# Patient Record
Sex: FEMALE | Race: OTHER | Employment: UNEMPLOYED | URBAN - METROPOLITAN AREA
[De-identification: names, ages, dates, MRNs, and addresses within clinical notes are randomized per-mention and may not be internally consistent; named-entity substitution may affect disease eponyms.]

---

## 2018-11-25 ENCOUNTER — APPOINTMENT (OUTPATIENT)
Dept: GENERAL RADIOLOGY | Age: 58
End: 2018-11-25
Attending: PHYSICIAN ASSISTANT

## 2018-11-25 ENCOUNTER — HOSPITAL ENCOUNTER (EMERGENCY)
Age: 58
Discharge: HOME | End: 2018-11-25
Attending: EMERGENCY MEDICINE

## 2018-11-25 VITALS
SYSTOLIC BLOOD PRESSURE: 178 MMHG | RESPIRATION RATE: 16 BRPM | WEIGHT: 176.38 LBS | HEART RATE: 83 BPM | TEMPERATURE: 99 F | DIASTOLIC BLOOD PRESSURE: 76 MMHG | OXYGEN SATURATION: 99 %

## 2018-11-25 DIAGNOSIS — S62.102A CLOSED FRACTURE OF LEFT WRIST, INITIAL ENCOUNTER: ICD-10-CM

## 2018-11-25 DIAGNOSIS — S50.02XA CONTUSION OF LEFT ELBOW, INITIAL ENCOUNTER: Primary | ICD-10-CM

## 2018-11-25 PROCEDURE — 99284 EMERGENCY DEPT VISIT MOD MDM: CPT

## 2018-11-25 PROCEDURE — 73110 X-RAY EXAM OF WRIST: CPT | Performed by: PHYSICIAN ASSISTANT

## 2018-11-25 PROCEDURE — 73080 X-RAY EXAM OF ELBOW: CPT | Performed by: PHYSICIAN ASSISTANT

## 2018-11-25 PROCEDURE — 29125 APPL SHORT ARM SPLINT STATIC: CPT

## 2018-11-25 RX ORDER — IBUPROFEN 600 MG/1
600 TABLET ORAL ONCE
Status: COMPLETED | OUTPATIENT
Start: 2018-11-25 | End: 2018-11-25

## 2018-11-25 RX ORDER — HYDROCODONE BITARTRATE AND ACETAMINOPHEN 5; 325 MG/1; MG/1
1-2 TABLET ORAL EVERY 4 HOURS PRN
Qty: 10 TABLET | Refills: 0 | Status: SHIPPED | OUTPATIENT
Start: 2018-11-25 | End: 2018-12-02

## 2018-11-26 NOTE — ED PROVIDER NOTES
Patient Seen in: [facility] Emergency Department In [place]    History   Patient presents with:  Upper Extremity Injury (musculoskeletal)    Stated Complaint: left elbow injury    53-year-old  female with a history of hypertension presents the ER Left wrist: She exhibits decreased range of motion, tenderness, bony tenderness and swelling. She exhibits no effusion, no crepitus, no deformity and no laceration. Arms:  Neurological: She is alert. Skin: Skin is warm and dry.          ED Cou Contusion of left elbow, initial encounter  (primary encounter diagnosis)  Closed fracture of left wrist, initial encounter    Disposition:  There is no disposition on file for this visit. There is no disposition time on file for this visit.     Follow-up:

## 2019-01-25 PROBLEM — S52.502D CLOSED FRACTURE OF DISTAL END OF LEFT RADIUS WITH ROUTINE HEALING, UNSPECIFIED FRACTURE MORPHOLOGY, SUBSEQUENT ENCOUNTER: Status: ACTIVE | Noted: 2019-01-25

## 2023-09-20 ENCOUNTER — APPOINTMENT (OUTPATIENT)
Dept: CT IMAGING | Age: 63
End: 2023-09-20
Attending: EMERGENCY MEDICINE

## 2023-09-20 ENCOUNTER — HOSPITAL ENCOUNTER (EMERGENCY)
Age: 63
Discharge: HOME | End: 2023-09-21
Attending: EMERGENCY MEDICINE

## 2023-09-20 ENCOUNTER — APPOINTMENT (OUTPATIENT)
Dept: GENERAL RADIOLOGY | Age: 63
End: 2023-09-20
Attending: EMERGENCY MEDICINE

## 2023-09-20 DIAGNOSIS — J18.9 COMMUNITY ACQUIRED PNEUMONIA, UNSPECIFIED LATERALITY: Primary | ICD-10-CM

## 2023-09-20 DIAGNOSIS — R42 LIGHT HEADEDNESS: ICD-10-CM

## 2023-09-20 LAB
ALBUMIN SERPL-MCNC: 3.3 G/DL (ref 3.4–5)
ALBUMIN/GLOB SERPL: 0.7 {RATIO} (ref 1–2)
ALP LIVER SERPL-CCNC: 99 U/L
ALT SERPL-CCNC: 22 U/L
ANION GAP SERPL CALC-SCNC: 4 MMOL/L (ref 0–18)
AST SERPL-CCNC: 21 U/L (ref 15–37)
BASOPHILS # BLD AUTO: 0.06 X10(3) UL (ref 0–0.2)
BASOPHILS NFR BLD AUTO: 0.6 %
BILIRUB SERPL-MCNC: 0.4 MG/DL (ref 0.1–2)
BILIRUB UR QL STRIP.AUTO: NEGATIVE
BUN BLD-MCNC: 15 MG/DL (ref 7–18)
CALCIUM BLD-MCNC: 8.9 MG/DL (ref 8.5–10.1)
CHLORIDE SERPL-SCNC: 105 MMOL/L (ref 98–112)
CLARITY UR REFRACT.AUTO: CLEAR
CO2 SERPL-SCNC: 28 MMOL/L (ref 21–32)
COLOR UR AUTO: YELLOW
CREAT BLD-MCNC: 0.92 MG/DL
EGFRCR SERPLBLD CKD-EPI 2021: 71 ML/MIN/1.73M2 (ref 60–?)
EOSINOPHIL # BLD AUTO: 0.24 X10(3) UL (ref 0–0.7)
EOSINOPHIL NFR BLD AUTO: 2.3 %
ERYTHROCYTE [DISTWIDTH] IN BLOOD BY AUTOMATED COUNT: 12.4 %
GLOBULIN PLAS-MCNC: 4.9 G/DL (ref 2.8–4.4)
GLUCOSE BLD-MCNC: 103 MG/DL (ref 70–99)
GLUCOSE UR STRIP.AUTO-MCNC: NEGATIVE MG/DL
HCT VFR BLD AUTO: 43.4 %
HGB BLD-MCNC: 14.6 G/DL
IMM GRANULOCYTES # BLD AUTO: 0.03 X10(3) UL (ref 0–1)
IMM GRANULOCYTES NFR BLD: 0.3 %
KETONES UR STRIP.AUTO-MCNC: NEGATIVE MG/DL
LYMPHOCYTES # BLD AUTO: 2.45 X10(3) UL (ref 1–4)
LYMPHOCYTES NFR BLD AUTO: 23.6 %
MCH RBC QN AUTO: 29.5 PG (ref 26–34)
MCHC RBC AUTO-ENTMCNC: 33.6 G/DL (ref 31–37)
MCV RBC AUTO: 87.7 FL
MONOCYTES # BLD AUTO: 0.71 X10(3) UL (ref 0.1–1)
MONOCYTES NFR BLD AUTO: 6.8 %
NEUTROPHILS # BLD AUTO: 6.88 X10 (3) UL (ref 1.5–7.7)
NEUTROPHILS # BLD AUTO: 6.88 X10(3) UL (ref 1.5–7.7)
NEUTROPHILS NFR BLD AUTO: 66.4 %
NITRITE UR QL STRIP.AUTO: NEGATIVE
OSMOLALITY SERPL CALC.SUM OF ELEC: 285 MOSM/KG (ref 275–295)
PH UR STRIP.AUTO: 6.5 [PH] (ref 5–8)
PLATELET # BLD AUTO: 267 10(3)UL (ref 150–450)
POTASSIUM SERPL-SCNC: 4 MMOL/L (ref 3.5–5.1)
PROT SERPL-MCNC: 8.2 G/DL (ref 6.4–8.2)
PROT UR STRIP.AUTO-MCNC: NEGATIVE MG/DL
RBC # BLD AUTO: 4.95 X10(6)UL
SODIUM SERPL-SCNC: 137 MMOL/L (ref 136–145)
SP GR UR STRIP.AUTO: 1.02 (ref 1–1.03)
TROPONIN I HIGH SENSITIVITY: 6 NG/L
UROBILINOGEN UR STRIP.AUTO-MCNC: 1 MG/DL
WBC # BLD AUTO: 10.4 X10(3) UL (ref 4–11)

## 2023-09-20 PROCEDURE — 93005 ELECTROCARDIOGRAM TRACING: CPT

## 2023-09-20 PROCEDURE — 99283 EMERGENCY DEPT VISIT LOW MDM: CPT

## 2023-09-20 PROCEDURE — 81001 URINALYSIS AUTO W/SCOPE: CPT | Performed by: EMERGENCY MEDICINE

## 2023-09-20 PROCEDURE — 96361 HYDRATE IV INFUSION ADD-ON: CPT

## 2023-09-20 PROCEDURE — 93010 ELECTROCARDIOGRAM REPORT: CPT

## 2023-09-20 PROCEDURE — 84484 ASSAY OF TROPONIN QUANT: CPT | Performed by: EMERGENCY MEDICINE

## 2023-09-20 PROCEDURE — 70450 CT HEAD/BRAIN W/O DYE: CPT | Performed by: EMERGENCY MEDICINE

## 2023-09-20 PROCEDURE — 71045 X-RAY EXAM CHEST 1 VIEW: CPT | Performed by: EMERGENCY MEDICINE

## 2023-09-20 PROCEDURE — 80053 COMPREHEN METABOLIC PANEL: CPT | Performed by: EMERGENCY MEDICINE

## 2023-09-20 PROCEDURE — 85025 COMPLETE CBC W/AUTO DIFF WBC: CPT | Performed by: EMERGENCY MEDICINE

## 2023-09-20 PROCEDURE — 99285 EMERGENCY DEPT VISIT HI MDM: CPT

## 2023-09-20 PROCEDURE — 81015 MICROSCOPIC EXAM OF URINE: CPT | Performed by: EMERGENCY MEDICINE

## 2023-09-20 PROCEDURE — 96360 HYDRATION IV INFUSION INIT: CPT

## 2023-09-20 RX ORDER — LISINOPRIL 10 MG/1
10 TABLET ORAL DAILY
COMMUNITY

## 2023-09-21 VITALS
OXYGEN SATURATION: 95 % | BODY MASS INDEX: 27.23 KG/M2 | SYSTOLIC BLOOD PRESSURE: 166 MMHG | TEMPERATURE: 98 F | DIASTOLIC BLOOD PRESSURE: 75 MMHG | HEART RATE: 82 BPM | WEIGHT: 148 LBS | HEIGHT: 62 IN | RESPIRATION RATE: 18 BRPM

## 2023-09-21 LAB
ATRIAL RATE: 86 BPM
P AXIS: 22 DEGREES
P-R INTERVAL: 132 MS
Q-T INTERVAL: 362 MS
QRS DURATION: 90 MS
QTC CALCULATION (BEZET): 433 MS
R AXIS: -56 DEGREES
SARS-COV-2 RNA RESP QL NAA+PROBE: NOT DETECTED
T AXIS: 27 DEGREES
VENTRICULAR RATE: 86 BPM

## 2023-09-21 RX ORDER — AMOXICILLIN AND CLAVULANATE POTASSIUM 875; 125 MG/1; MG/1
1 TABLET, FILM COATED ORAL ONCE
Status: COMPLETED | OUTPATIENT
Start: 2023-09-21 | End: 2023-09-21

## 2023-09-21 RX ORDER — AZITHROMYCIN 250 MG/1
TABLET, FILM COATED ORAL
Qty: 6 TABLET | Refills: 0 | Status: SHIPPED | OUTPATIENT
Start: 2023-09-21 | End: 2023-09-26

## 2023-09-21 RX ORDER — AMOXICILLIN AND CLAVULANATE POTASSIUM 875; 125 MG/1; MG/1
1 TABLET, FILM COATED ORAL 2 TIMES DAILY
Qty: 10 TABLET | Refills: 0 | Status: SHIPPED | OUTPATIENT
Start: 2023-09-21 | End: 2023-09-26

## 2023-09-21 NOTE — DISCHARGE INSTRUCTIONS
Follow up with your primary care doctor within the next week for reassessment. Take the antibiotics as prescribed. Following completion of the antibiotics, you need a repeat chest x-ray to  ensure clearance of the abnormality in your lungs seen today. Return for any fevers, chest pain, shortness of breath or any other concerning symptoms.

## (undated) NOTE — ED AVS SNAPSHOT
Major Gray   MRN: [id number]    Department:  THE [facility] Emergency Department in [facility]   Date of Visit:  11/25/2018           Disclosure     Insurance plans vary and the physician(s) referred by the ER may not be covered by your plan.  Please conta tell this physician (or your personal doctor if your instructions are to return to your personal doctor) about any new or lasting problems. The primary care or specialist physician will see patients referred from the [facility] Emergency Department.  Joaquina Becker